# Patient Record
Sex: FEMALE | Race: WHITE | NOT HISPANIC OR LATINO | URBAN - METROPOLITAN AREA
[De-identification: names, ages, dates, MRNs, and addresses within clinical notes are randomized per-mention and may not be internally consistent; named-entity substitution may affect disease eponyms.]

---

## 2019-02-12 ENCOUNTER — OUTPATIENT (OUTPATIENT)
Dept: OUTPATIENT SERVICES | Facility: HOSPITAL | Age: 64
LOS: 1 days | End: 2019-02-12
Payer: MEDICAID

## 2019-02-12 VITALS
RESPIRATION RATE: 18 BRPM | SYSTOLIC BLOOD PRESSURE: 161 MMHG | TEMPERATURE: 98 F | WEIGHT: 175.05 LBS | DIASTOLIC BLOOD PRESSURE: 84 MMHG | HEART RATE: 51 BPM | HEIGHT: 63 IN | OXYGEN SATURATION: 100 %

## 2019-02-12 DIAGNOSIS — F41.9 ANXIETY DISORDER, UNSPECIFIED: ICD-10-CM

## 2019-02-12 DIAGNOSIS — R00.1 BRADYCARDIA, UNSPECIFIED: ICD-10-CM

## 2019-02-12 DIAGNOSIS — N60.01 SOLITARY CYST OF RIGHT BREAST: Chronic | ICD-10-CM

## 2019-02-12 DIAGNOSIS — M17.12 UNILATERAL PRIMARY OSTEOARTHRITIS, LEFT KNEE: ICD-10-CM

## 2019-02-12 DIAGNOSIS — Z98.890 OTHER SPECIFIED POSTPROCEDURAL STATES: Chronic | ICD-10-CM

## 2019-02-12 DIAGNOSIS — Z01.818 ENCOUNTER FOR OTHER PREPROCEDURAL EXAMINATION: ICD-10-CM

## 2019-02-12 DIAGNOSIS — Z90.710 ACQUIRED ABSENCE OF BOTH CERVIX AND UTERUS: Chronic | ICD-10-CM

## 2019-02-12 DIAGNOSIS — Z90.49 ACQUIRED ABSENCE OF OTHER SPECIFIED PARTS OF DIGESTIVE TRACT: Chronic | ICD-10-CM

## 2019-02-12 LAB
ALBUMIN SERPL ELPH-MCNC: 3.7 G/DL — SIGNIFICANT CHANGE UP (ref 3.5–5)
ALP SERPL-CCNC: 48 U/L — SIGNIFICANT CHANGE UP (ref 40–120)
ALT FLD-CCNC: 17 U/L DA — SIGNIFICANT CHANGE UP (ref 10–60)
ANION GAP SERPL CALC-SCNC: 6 MMOL/L — SIGNIFICANT CHANGE UP (ref 5–17)
APTT BLD: 29.8 SEC — SIGNIFICANT CHANGE UP (ref 27.5–36.3)
AST SERPL-CCNC: 14 U/L — SIGNIFICANT CHANGE UP (ref 10–40)
BILIRUB SERPL-MCNC: 0.5 MG/DL — SIGNIFICANT CHANGE UP (ref 0.2–1.2)
BLD GP AB SCN SERPL QL: SIGNIFICANT CHANGE UP
BUN SERPL-MCNC: 13 MG/DL — SIGNIFICANT CHANGE UP (ref 7–18)
CALCIUM SERPL-MCNC: 9 MG/DL — SIGNIFICANT CHANGE UP (ref 8.4–10.5)
CHLORIDE SERPL-SCNC: 105 MMOL/L — SIGNIFICANT CHANGE UP (ref 96–108)
CO2 SERPL-SCNC: 29 MMOL/L — SIGNIFICANT CHANGE UP (ref 22–31)
CREAT SERPL-MCNC: 0.71 MG/DL — SIGNIFICANT CHANGE UP (ref 0.5–1.3)
GLUCOSE SERPL-MCNC: 93 MG/DL — SIGNIFICANT CHANGE UP (ref 70–99)
HBA1C BLD-MCNC: 5.2 % — SIGNIFICANT CHANGE UP (ref 4–5.6)
HCT VFR BLD CALC: 40.3 % — SIGNIFICANT CHANGE UP (ref 34.5–45)
HGB BLD-MCNC: 13.3 G/DL — SIGNIFICANT CHANGE UP (ref 11.5–15.5)
INR BLD: 1.13 RATIO — SIGNIFICANT CHANGE UP (ref 0.88–1.16)
MCHC RBC-ENTMCNC: 32 PG — SIGNIFICANT CHANGE UP (ref 27–34)
MCHC RBC-ENTMCNC: 33 GM/DL — SIGNIFICANT CHANGE UP (ref 32–36)
MCV RBC AUTO: 97.2 FL — SIGNIFICANT CHANGE UP (ref 80–100)
MRSA PCR RESULT.: SIGNIFICANT CHANGE UP
PLATELET # BLD AUTO: 204 K/UL — SIGNIFICANT CHANGE UP (ref 150–400)
POTASSIUM SERPL-MCNC: 4.3 MMOL/L — SIGNIFICANT CHANGE UP (ref 3.5–5.3)
POTASSIUM SERPL-SCNC: 4.3 MMOL/L — SIGNIFICANT CHANGE UP (ref 3.5–5.3)
PROT SERPL-MCNC: 6.9 G/DL — SIGNIFICANT CHANGE UP (ref 6–8.3)
PROTHROM AB SERPL-ACNC: 12.6 SEC — SIGNIFICANT CHANGE UP (ref 10–12.9)
RBC # BLD: 4.14 M/UL — SIGNIFICANT CHANGE UP (ref 3.8–5.2)
RBC # FLD: 12.3 % — SIGNIFICANT CHANGE UP (ref 10.3–14.5)
S AUREUS DNA NOSE QL NAA+PROBE: SIGNIFICANT CHANGE UP
SODIUM SERPL-SCNC: 140 MMOL/L — SIGNIFICANT CHANGE UP (ref 135–145)
T3 SERPL-MCNC: 85 NG/DL — SIGNIFICANT CHANGE UP (ref 80–200)
TSH SERPL-MCNC: 0.7 UU/ML — SIGNIFICANT CHANGE UP (ref 0.34–4.82)
WBC # BLD: 4.04 K/UL — SIGNIFICANT CHANGE UP (ref 3.8–10.5)
WBC # FLD AUTO: 4.04 K/UL — SIGNIFICANT CHANGE UP (ref 3.8–10.5)

## 2019-02-12 PROCEDURE — 93005 ELECTROCARDIOGRAM TRACING: CPT

## 2019-02-12 PROCEDURE — 85730 THROMBOPLASTIN TIME PARTIAL: CPT

## 2019-02-12 PROCEDURE — 80053 COMPREHEN METABOLIC PANEL: CPT

## 2019-02-12 PROCEDURE — 86900 BLOOD TYPING SEROLOGIC ABO: CPT

## 2019-02-12 PROCEDURE — 86850 RBC ANTIBODY SCREEN: CPT

## 2019-02-12 PROCEDURE — 87640 STAPH A DNA AMP PROBE: CPT

## 2019-02-12 PROCEDURE — 36415 COLL VENOUS BLD VENIPUNCTURE: CPT

## 2019-02-12 PROCEDURE — 85027 COMPLETE CBC AUTOMATED: CPT

## 2019-02-12 PROCEDURE — 84480 ASSAY TRIIODOTHYRONINE (T3): CPT

## 2019-02-12 PROCEDURE — 93306 TTE W/DOPPLER COMPLETE: CPT

## 2019-02-12 PROCEDURE — 84443 ASSAY THYROID STIM HORMONE: CPT

## 2019-02-12 PROCEDURE — 84436 ASSAY OF TOTAL THYROXINE: CPT

## 2019-02-12 PROCEDURE — 86901 BLOOD TYPING SEROLOGIC RH(D): CPT

## 2019-02-12 PROCEDURE — 85610 PROTHROMBIN TIME: CPT

## 2019-02-12 PROCEDURE — 83036 HEMOGLOBIN GLYCOSYLATED A1C: CPT

## 2019-02-12 PROCEDURE — G0463: CPT

## 2019-02-12 PROCEDURE — 87641 MR-STAPH DNA AMP PROBE: CPT

## 2019-02-12 RX ORDER — SODIUM CHLORIDE 9 MG/ML
3 INJECTION INTRAMUSCULAR; INTRAVENOUS; SUBCUTANEOUS EVERY 8 HOURS
Qty: 0 | Refills: 0 | Status: DISCONTINUED | OUTPATIENT
Start: 2019-02-25 | End: 2019-02-27

## 2019-02-12 RX ORDER — OXYCODONE HYDROCHLORIDE 5 MG/1
1 TABLET ORAL
Qty: 0 | Refills: 0 | COMMUNITY

## 2019-02-12 RX ORDER — ALPRAZOLAM 0.25 MG
1 TABLET ORAL
Qty: 0 | Refills: 0 | COMMUNITY

## 2019-02-12 RX ORDER — TRANEXAMIC ACID 100 MG/ML
1000 INJECTION, SOLUTION INTRAVENOUS ONCE
Qty: 0 | Refills: 0 | Status: DISCONTINUED | OUTPATIENT
Start: 2019-02-25 | End: 2019-02-27

## 2019-02-12 NOTE — H&P PST ADULT - NEGATIVE GASTROINTESTINAL SYMPTOMS
no diarrhea/no change in bowel habits/no flatulence/no nausea/no constipation/no vomiting/no abdominal pain/no melena

## 2019-02-12 NOTE — H&P PST ADULT - PROBLEM SELECTOR PLAN 3
h/o bradycardia during colonoscopy and during epidural procedures. Cardiac work-up negative as per pt. Case discussed with Anesthesiologist Dr Garcia who is requesting cardiac clearance. Seen by Dr Veras for medical clearance who is also requesting Cardiac clearance. Pt to be seen on 2/19/2019 by Dr Randall for cardiac clearance. Echocardiogram done today-result pending. h/o bradycardia during colonoscopy and during epidural procedures. Cardiac work-up negative as per pt. Case discussed with Anesthesiologist Dr Garcia who is requesting cardiac clearance. Seen by Dr Vreas for medical clearance who is also requesting Cardiac clearance. Pt to be seen on 2/19/2019 by Dr Randall for cardiac clearance. Echocardiogram done today-result pending. As per pt, she underwent cardiac cath at [facility] which was negative. Old cardiac records from [facility] requested.

## 2019-02-12 NOTE — H&P PST ADULT - NSANTHOSAYNRD_GEN_A_CORE
No. SAMEER screening performed.  STOP BANG Legend: 0-2 = LOW Risk; 3-4 = INTERMEDIATE Risk; 5-8 = HIGH Risk

## 2019-02-12 NOTE — H&P PST ADULT - HISTORY OF PRESENT ILLNESS
63 yr old female with PMH of seasonal allergies, presents with c/o left knee pain due to osteoarthritis. Pt reports worsening of pain with prolonged ambulation and standing. Recent X-Ray revealed loss of cartilage. Pt for left total knee replacement on 2/25/2019 63 yr old female with PMH of seasonal allergies, presents with c/o left knee pain due to osteoarthritis sustained after falling down. Pt reports worsening of pain with prolonged ambulation and standing. Recent X-Ray revealed loss of cartilage. Pt for left total knee replacement on 2/25/2019 63 yr old female with PMH of seasonal allergies, depression, anxiety, panic attacks, bradycardia, presents with c/o left knee pain due to osteoarthritis sustained after falling down. Pt reports worsening of pain with prolonged ambulation and standing. Recent X-Ray revealed loss of cartilage. Pt for left total knee replacement on 2/25/2019

## 2019-02-12 NOTE — H&P PST ADULT - RS GEN PE MLT RESP DETAILS PC
no rales/airway patent/respirations non-labored/no chest wall tenderness/no intercostal retractions/normal/breath sounds equal/no wheezes/no subcutaneous emphysema/no rhonchi/good air movement/clear to auscultation bilaterally

## 2019-02-12 NOTE — H&P PST ADULT - PMH
Osteoarthritis of left knee Anxiety    Osteoarthritis of left knee    Panic attack    Seasonal allergies Anxiety    Bradycardia    Depression    Osteoarthritis of left knee    Panic attack    Seasonal allergies

## 2019-02-12 NOTE — H&P PST ADULT - LAST ECHOCARDIOGRAM
2/12/2019 2/12/2019 mild MR, trace TR, normal left and right ventricular systolic function, bradycardic during entire study, EF 55 to 66%.

## 2019-02-12 NOTE — H&P PST ADULT - PSH
Cyst of right breast  h/o aspiration of cyst  H/O abdominal hysterectomy    H/O arthroscopy of right knee    H/O eye surgery  LASIK surgery  H/O varicose vein ligation and stripping  left left  History of arthroscopy of left knee  x 2  History of bunionectomy of right great toe    History of cholecystectomy    History of open reduction and internal fixation (ORIF) procedure  right wrist fractrure

## 2019-02-12 NOTE — H&P PST ADULT - NEGATIVE ALLERGY TYPES
no reactions to medicines/no reactions to food/no reactions to insect bites/no indoor environmental allergies/no reactions to animals

## 2019-02-12 NOTE — H&P PST ADULT - FAMILY HISTORY
Father  Still living? Yes, Estimated age: Age Unknown  Family history of hypertension, Age at diagnosis: Age Unknown  Cerebrovascular accident, Age at diagnosis: Age Unknown     Mother  Still living? Yes, Estimated age: Age Unknown  Family history of hypertension, Age at diagnosis: Age Unknown     Sibling  Still living? Yes, Estimated age: Age Unknown  Asthma, Age at diagnosis: Age Unknown  Family history of coronary artery disease in brother, Age at diagnosis: Age Unknown  Family history of percutaneous transluminal coronary angioplasty, Age at diagnosis: Age Unknown

## 2019-02-12 NOTE — H&P PST ADULT - NEGATIVE CARDIOVASCULAR SYMPTOMS
no orthopnea/no chest pain/no claudication/no peripheral edema/no paroxysmal nocturnal dyspnea/no dyspnea on exertion/no palpitations

## 2019-02-12 NOTE — H&P PST ADULT - PROBLEM SELECTOR PLAN 1
left total knee replacement on 2/25/2019 left total knee replacement on 2/25/2019. No NSAIDs 1 week before surgery.  Advised to take Tylenol as needed for pain.

## 2019-02-12 NOTE — H&P PST ADULT - MUSCULOSKELETAL COMMENTS
c/o left knee pain due to OA, collar bone out c/o left knee pain due to OA, collar bone popping out on right side

## 2019-02-12 NOTE — H&P PST ADULT - PROBLEM SELECTOR PLAN 2
Continue use of meds and take with sips of water on day of surgery. Follow-up with provider postop for management.

## 2019-02-12 NOTE — H&P PST ADULT - ASSESSMENT
63 yr old female with PMH of seasonal allergies, presents with c/o left knee pain due to osteoarthritis sustained after falling down. Pt reports worsening of pain with prolonged ambulation and standing. Recent X-Ray revealed loss of cartilage. Pt for left total knee replacement on 2/25/2019 63 yr old female with PMH of seasonal allergies,  depression, anxiety, panic attacks, bradycardia presents with left knee osteoarthritis. Pt for left total knee replacement on 2/25/2019 63 yr old female with PMH of seasonal allergies,  depression, anxiety, panic attacks, bradycardia presents with left knee osteoarthritis. Pt for left total knee replacement on 2/25/2019.

## 2019-02-25 ENCOUNTER — INPATIENT (INPATIENT)
Facility: HOSPITAL | Age: 64
LOS: 1 days | Discharge: HOME | DRG: 470 | End: 2019-02-27
Attending: ORTHOPAEDIC SURGERY | Admitting: ORTHOPAEDIC SURGERY
Payer: MEDICAID

## 2019-02-25 VITALS
OXYGEN SATURATION: 100 % | DIASTOLIC BLOOD PRESSURE: 84 MMHG | TEMPERATURE: 97 F | HEART RATE: 52 BPM | SYSTOLIC BLOOD PRESSURE: 161 MMHG | WEIGHT: 175.05 LBS | HEIGHT: 63 IN | RESPIRATION RATE: 14 BRPM

## 2019-02-25 DIAGNOSIS — Z98.890 OTHER SPECIFIED POSTPROCEDURAL STATES: Chronic | ICD-10-CM

## 2019-02-25 DIAGNOSIS — N60.01 SOLITARY CYST OF RIGHT BREAST: Chronic | ICD-10-CM

## 2019-02-25 DIAGNOSIS — Z90.49 ACQUIRED ABSENCE OF OTHER SPECIFIED PARTS OF DIGESTIVE TRACT: Chronic | ICD-10-CM

## 2019-02-25 DIAGNOSIS — Z90.710 ACQUIRED ABSENCE OF BOTH CERVIX AND UTERUS: Chronic | ICD-10-CM

## 2019-02-25 DIAGNOSIS — M17.12 UNILATERAL PRIMARY OSTEOARTHRITIS, LEFT KNEE: ICD-10-CM

## 2019-02-25 LAB
ANION GAP SERPL CALC-SCNC: 4 MMOL/L — LOW (ref 5–17)
BLD GP AB SCN SERPL QL: SIGNIFICANT CHANGE UP
BUN SERPL-MCNC: 15 MG/DL — SIGNIFICANT CHANGE UP (ref 7–18)
CALCIUM SERPL-MCNC: 8.6 MG/DL — SIGNIFICANT CHANGE UP (ref 8.4–10.5)
CHLORIDE SERPL-SCNC: 107 MMOL/L — SIGNIFICANT CHANGE UP (ref 96–108)
CO2 SERPL-SCNC: 31 MMOL/L — SIGNIFICANT CHANGE UP (ref 22–31)
CREAT SERPL-MCNC: 0.74 MG/DL — SIGNIFICANT CHANGE UP (ref 0.5–1.3)
GLUCOSE SERPL-MCNC: 91 MG/DL — SIGNIFICANT CHANGE UP (ref 70–99)
HCT VFR BLD CALC: 39.4 % — SIGNIFICANT CHANGE UP (ref 34.5–45)
HGB BLD-MCNC: 12.7 G/DL — SIGNIFICANT CHANGE UP (ref 11.5–15.5)
MCHC RBC-ENTMCNC: 31.7 PG — SIGNIFICANT CHANGE UP (ref 27–34)
MCHC RBC-ENTMCNC: 32.2 GM/DL — SIGNIFICANT CHANGE UP (ref 32–36)
MCV RBC AUTO: 98.3 FL — SIGNIFICANT CHANGE UP (ref 80–100)
NRBC # BLD: 0 /100 WBCS — SIGNIFICANT CHANGE UP (ref 0–0)
PLATELET # BLD AUTO: 191 K/UL — SIGNIFICANT CHANGE UP (ref 150–400)
POTASSIUM SERPL-MCNC: 4.5 MMOL/L — SIGNIFICANT CHANGE UP (ref 3.5–5.3)
POTASSIUM SERPL-SCNC: 4.5 MMOL/L — SIGNIFICANT CHANGE UP (ref 3.5–5.3)
RBC # BLD: 4.01 M/UL — SIGNIFICANT CHANGE UP (ref 3.8–5.2)
RBC # FLD: 12.4 % — SIGNIFICANT CHANGE UP (ref 10.3–14.5)
SODIUM SERPL-SCNC: 142 MMOL/L — SIGNIFICANT CHANGE UP (ref 135–145)
WBC # BLD: 3.87 K/UL — SIGNIFICANT CHANGE UP (ref 3.8–10.5)
WBC # FLD AUTO: 3.87 K/UL — SIGNIFICANT CHANGE UP (ref 3.8–10.5)

## 2019-02-25 PROCEDURE — 73562 X-RAY EXAM OF KNEE 3: CPT | Mod: 26,LT,76

## 2019-02-25 PROCEDURE — 27465 SHORTENING OF THIGH BONE: CPT | Mod: AS,LT

## 2019-02-25 PROCEDURE — 27447 TOTAL KNEE ARTHROPLASTY: CPT | Mod: AS,LT

## 2019-02-25 RX ORDER — ACETAMINOPHEN 500 MG
1000 TABLET ORAL EVERY 8 HOURS
Qty: 0 | Refills: 0 | Status: DISCONTINUED | OUTPATIENT
Start: 2019-02-25 | End: 2019-02-25

## 2019-02-25 RX ORDER — OXYCODONE HYDROCHLORIDE 5 MG/1
5 TABLET ORAL EVERY 4 HOURS
Qty: 0 | Refills: 0 | Status: DISCONTINUED | OUTPATIENT
Start: 2019-02-25 | End: 2019-02-27

## 2019-02-25 RX ORDER — CELECOXIB 200 MG/1
200 CAPSULE ORAL ONCE
Qty: 0 | Refills: 0 | Status: COMPLETED | OUTPATIENT
Start: 2019-02-25 | End: 2019-02-25

## 2019-02-25 RX ORDER — POLYETHYLENE GLYCOL 3350 17 G/17G
17 POWDER, FOR SOLUTION ORAL DAILY
Qty: 0 | Refills: 0 | Status: DISCONTINUED | OUTPATIENT
Start: 2019-02-25 | End: 2019-02-27

## 2019-02-25 RX ORDER — CEFAZOLIN SODIUM 1 G
1000 VIAL (EA) INJECTION EVERY 8 HOURS
Qty: 0 | Refills: 0 | Status: COMPLETED | OUTPATIENT
Start: 2019-02-25 | End: 2019-02-26

## 2019-02-25 RX ORDER — HYDROMORPHONE HYDROCHLORIDE 2 MG/ML
0.5 INJECTION INTRAMUSCULAR; INTRAVENOUS; SUBCUTANEOUS EVERY 4 HOURS
Qty: 0 | Refills: 0 | Status: DISCONTINUED | OUTPATIENT
Start: 2019-02-25 | End: 2019-02-27

## 2019-02-25 RX ORDER — GABAPENTIN 400 MG/1
100 CAPSULE ORAL ONCE
Qty: 0 | Refills: 0 | Status: COMPLETED | OUTPATIENT
Start: 2019-02-25 | End: 2019-02-25

## 2019-02-25 RX ORDER — MAGNESIUM HYDROXIDE 400 MG/1
30 TABLET, CHEWABLE ORAL DAILY
Qty: 0 | Refills: 0 | Status: DISCONTINUED | OUTPATIENT
Start: 2019-02-25 | End: 2019-02-27

## 2019-02-25 RX ORDER — DOCUSATE SODIUM 100 MG
100 CAPSULE ORAL THREE TIMES A DAY
Qty: 0 | Refills: 0 | Status: DISCONTINUED | OUTPATIENT
Start: 2019-02-25 | End: 2019-02-27

## 2019-02-25 RX ORDER — ALPRAZOLAM 0.25 MG
1 TABLET ORAL
Qty: 0 | Refills: 0 | Status: DISCONTINUED | OUTPATIENT
Start: 2019-02-25 | End: 2019-02-27

## 2019-02-25 RX ORDER — ASCORBIC ACID 60 MG
500 TABLET,CHEWABLE ORAL
Qty: 0 | Refills: 0 | Status: DISCONTINUED | OUTPATIENT
Start: 2019-02-25 | End: 2019-02-27

## 2019-02-25 RX ORDER — TRAMADOL HYDROCHLORIDE 50 MG/1
50 TABLET ORAL ONCE
Qty: 0 | Refills: 0 | Status: DISCONTINUED | OUTPATIENT
Start: 2019-02-25 | End: 2019-02-25

## 2019-02-25 RX ORDER — FOLIC ACID 0.8 MG
1 TABLET ORAL DAILY
Qty: 0 | Refills: 0 | Status: DISCONTINUED | OUTPATIENT
Start: 2019-02-25 | End: 2019-02-27

## 2019-02-25 RX ORDER — FERROUS SULFATE 325(65) MG
325 TABLET ORAL
Qty: 0 | Refills: 0 | Status: DISCONTINUED | OUTPATIENT
Start: 2019-02-25 | End: 2019-02-27

## 2019-02-25 RX ORDER — SENNA PLUS 8.6 MG/1
2 TABLET ORAL AT BEDTIME
Qty: 0 | Refills: 0 | Status: DISCONTINUED | OUTPATIENT
Start: 2019-02-25 | End: 2019-02-27

## 2019-02-25 RX ORDER — ACETAMINOPHEN 500 MG
650 TABLET ORAL EVERY 6 HOURS
Qty: 0 | Refills: 0 | Status: DISCONTINUED | OUTPATIENT
Start: 2019-02-25 | End: 2019-02-27

## 2019-02-25 RX ORDER — ENOXAPARIN SODIUM 100 MG/ML
30 INJECTION SUBCUTANEOUS EVERY 12 HOURS
Qty: 0 | Refills: 0 | Status: DISCONTINUED | OUTPATIENT
Start: 2019-02-26 | End: 2019-02-27

## 2019-02-25 RX ORDER — TRAMADOL HYDROCHLORIDE 50 MG/1
50 TABLET ORAL EVERY 8 HOURS
Qty: 0 | Refills: 0 | Status: DISCONTINUED | OUTPATIENT
Start: 2019-02-25 | End: 2019-02-27

## 2019-02-25 RX ORDER — ACETAMINOPHEN 500 MG
1000 TABLET ORAL EVERY 8 HOURS
Qty: 0 | Refills: 0 | Status: COMPLETED | OUTPATIENT
Start: 2019-02-25 | End: 2019-02-26

## 2019-02-25 RX ORDER — SODIUM CHLORIDE 9 MG/ML
1000 INJECTION INTRAMUSCULAR; INTRAVENOUS; SUBCUTANEOUS
Qty: 0 | Refills: 0 | Status: DISCONTINUED | OUTPATIENT
Start: 2019-02-25 | End: 2019-02-27

## 2019-02-25 RX ORDER — ONDANSETRON 8 MG/1
4 TABLET, FILM COATED ORAL EVERY 6 HOURS
Qty: 0 | Refills: 0 | Status: DISCONTINUED | OUTPATIENT
Start: 2019-02-25 | End: 2019-02-27

## 2019-02-25 RX ORDER — KETOROLAC TROMETHAMINE 30 MG/ML
30 SYRINGE (ML) INJECTION EVERY 6 HOURS
Qty: 0 | Refills: 0 | Status: DISCONTINUED | OUTPATIENT
Start: 2019-02-25 | End: 2019-02-27

## 2019-02-25 RX ADMIN — Medication 100 MILLIGRAM(S): at 17:45

## 2019-02-25 RX ADMIN — TRAMADOL HYDROCHLORIDE 50 MILLIGRAM(S): 50 TABLET ORAL at 08:33

## 2019-02-25 RX ADMIN — GABAPENTIN 100 MILLIGRAM(S): 400 CAPSULE ORAL at 08:33

## 2019-02-25 RX ADMIN — Medication 100 MILLIGRAM(S): at 23:11

## 2019-02-25 RX ADMIN — SODIUM CHLORIDE 3 MILLILITER(S): 9 INJECTION INTRAMUSCULAR; INTRAVENOUS; SUBCUTANEOUS at 23:08

## 2019-02-25 RX ADMIN — Medication 400 MILLIGRAM(S): at 20:40

## 2019-02-25 RX ADMIN — Medication 1 TABLET(S): at 23:10

## 2019-02-25 RX ADMIN — Medication 1000 MILLIGRAM(S): at 20:51

## 2019-02-25 RX ADMIN — Medication 1 MILLIGRAM(S): at 23:08

## 2019-02-25 RX ADMIN — Medication 1 MILLIGRAM(S): at 23:10

## 2019-02-25 RX ADMIN — SODIUM CHLORIDE 3 MILLILITER(S): 9 INJECTION INTRAMUSCULAR; INTRAVENOUS; SUBCUTANEOUS at 08:35

## 2019-02-25 RX ADMIN — CELECOXIB 200 MILLIGRAM(S): 200 CAPSULE ORAL at 08:33

## 2019-02-25 RX ADMIN — TRAMADOL HYDROCHLORIDE 50 MILLIGRAM(S): 50 TABLET ORAL at 23:08

## 2019-02-25 NOTE — PHYSICAL THERAPY INITIAL EVALUATION ADULT - PERTINENT HX OF CURRENT PROBLEM, REHAB EVAL
Pt. c/o left knee pain due to osteoarthritis sustained after falling down. Pt reports worsening of pain with prolonged ambulation and standing.. Underwent left TKR today.

## 2019-02-25 NOTE — PHYSICAL THERAPY INITIAL EVALUATION ADULT - CRITERIA FOR SKILLED THERAPEUTIC INTERVENTIONS
rehab potential/functional limitations in following categories/risk reduction/prevention/anticipated discharge recommendation/impairments found/therapy frequency/predicted duration of therapy intervention/anticipated equipment needs at discharge

## 2019-02-25 NOTE — PHYSICAL THERAPY INITIAL EVALUATION ADULT - GENERAL OBSERVATIONS, REHAB EVAL
Pt. found lying supine in NAD; on SCD's and IV's. Pt. cooperative and motivated during eval. Pt's spouse was present at bedside. Left knee dressing/ace wrap c/d/i.

## 2019-02-26 DIAGNOSIS — Z96.652 PRESENCE OF LEFT ARTIFICIAL KNEE JOINT: ICD-10-CM

## 2019-02-26 DIAGNOSIS — G89.18 OTHER ACUTE POSTPROCEDURAL PAIN: ICD-10-CM

## 2019-02-26 LAB
ANION GAP SERPL CALC-SCNC: 4 MMOL/L — LOW (ref 5–17)
BUN SERPL-MCNC: 13 MG/DL — SIGNIFICANT CHANGE UP (ref 7–18)
CALCIUM SERPL-MCNC: 8 MG/DL — LOW (ref 8.4–10.5)
CHLORIDE SERPL-SCNC: 106 MMOL/L — SIGNIFICANT CHANGE UP (ref 96–108)
CO2 SERPL-SCNC: 29 MMOL/L — SIGNIFICANT CHANGE UP (ref 22–31)
CREAT SERPL-MCNC: 0.69 MG/DL — SIGNIFICANT CHANGE UP (ref 0.5–1.3)
GLUCOSE SERPL-MCNC: 112 MG/DL — HIGH (ref 70–99)
HCT VFR BLD CALC: 31.8 % — LOW (ref 34.5–45)
HGB BLD-MCNC: 10.2 G/DL — LOW (ref 11.5–15.5)
MCHC RBC-ENTMCNC: 31.4 PG — SIGNIFICANT CHANGE UP (ref 27–34)
MCHC RBC-ENTMCNC: 32.1 GM/DL — SIGNIFICANT CHANGE UP (ref 32–36)
MCV RBC AUTO: 97.8 FL — SIGNIFICANT CHANGE UP (ref 80–100)
NRBC # BLD: 0 /100 WBCS — SIGNIFICANT CHANGE UP (ref 0–0)
PLATELET # BLD AUTO: 158 K/UL — SIGNIFICANT CHANGE UP (ref 150–400)
POTASSIUM SERPL-MCNC: 4 MMOL/L — SIGNIFICANT CHANGE UP (ref 3.5–5.3)
POTASSIUM SERPL-SCNC: 4 MMOL/L — SIGNIFICANT CHANGE UP (ref 3.5–5.3)
RBC # BLD: 3.25 M/UL — LOW (ref 3.8–5.2)
RBC # FLD: 12.6 % — SIGNIFICANT CHANGE UP (ref 10.3–14.5)
SODIUM SERPL-SCNC: 139 MMOL/L — SIGNIFICANT CHANGE UP (ref 135–145)
WBC # BLD: 4.76 K/UL — SIGNIFICANT CHANGE UP (ref 3.8–10.5)
WBC # FLD AUTO: 4.76 K/UL — SIGNIFICANT CHANGE UP (ref 3.8–10.5)

## 2019-02-26 RX ADMIN — Medication 100 MILLIGRAM(S): at 06:05

## 2019-02-26 RX ADMIN — Medication 400 MILLIGRAM(S): at 11:58

## 2019-02-26 RX ADMIN — Medication 100 MILLIGRAM(S): at 02:27

## 2019-02-26 RX ADMIN — TRAMADOL HYDROCHLORIDE 50 MILLIGRAM(S): 50 TABLET ORAL at 07:06

## 2019-02-26 RX ADMIN — Medication 1 MILLIGRAM(S): at 21:28

## 2019-02-26 RX ADMIN — SODIUM CHLORIDE 3 MILLILITER(S): 9 INJECTION INTRAMUSCULAR; INTRAVENOUS; SUBCUTANEOUS at 06:03

## 2019-02-26 RX ADMIN — OXYCODONE HYDROCHLORIDE 5 MILLIGRAM(S): 5 TABLET ORAL at 23:37

## 2019-02-26 RX ADMIN — Medication 1 TABLET(S): at 13:59

## 2019-02-26 RX ADMIN — Medication 100 MILLIGRAM(S): at 13:59

## 2019-02-26 RX ADMIN — Medication 325 MILLIGRAM(S): at 17:52

## 2019-02-26 RX ADMIN — Medication 1 MILLIGRAM(S): at 13:55

## 2019-02-26 RX ADMIN — Medication 1000 MILLIGRAM(S): at 03:45

## 2019-02-26 RX ADMIN — SODIUM CHLORIDE 3 MILLILITER(S): 9 INJECTION INTRAMUSCULAR; INTRAVENOUS; SUBCUTANEOUS at 21:29

## 2019-02-26 RX ADMIN — TRAMADOL HYDROCHLORIDE 50 MILLIGRAM(S): 50 TABLET ORAL at 00:10

## 2019-02-26 RX ADMIN — Medication 1 MILLIGRAM(S): at 06:05

## 2019-02-26 RX ADMIN — Medication 20 MILLIGRAM(S): at 11:26

## 2019-02-26 RX ADMIN — Medication 400 MILLIGRAM(S): at 03:29

## 2019-02-26 RX ADMIN — OXYCODONE HYDROCHLORIDE 5 MILLIGRAM(S): 5 TABLET ORAL at 22:35

## 2019-02-26 RX ADMIN — Medication 1000 MILLIGRAM(S): at 12:13

## 2019-02-26 RX ADMIN — Medication 30 MILLIGRAM(S): at 11:39

## 2019-02-26 RX ADMIN — Medication 30 MILLIGRAM(S): at 11:24

## 2019-02-26 RX ADMIN — ENOXAPARIN SODIUM 30 MILLIGRAM(S): 100 INJECTION SUBCUTANEOUS at 13:53

## 2019-02-26 RX ADMIN — POLYETHYLENE GLYCOL 3350 17 GRAM(S): 17 POWDER, FOR SOLUTION ORAL at 11:26

## 2019-02-26 RX ADMIN — OXYCODONE HYDROCHLORIDE 5 MILLIGRAM(S): 5 TABLET ORAL at 17:51

## 2019-02-26 RX ADMIN — Medication 500 MILLIGRAM(S): at 17:52

## 2019-02-26 RX ADMIN — OXYCODONE HYDROCHLORIDE 5 MILLIGRAM(S): 5 TABLET ORAL at 08:33

## 2019-02-26 RX ADMIN — Medication 500 MILLIGRAM(S): at 06:05

## 2019-02-26 RX ADMIN — ENOXAPARIN SODIUM 30 MILLIGRAM(S): 100 INJECTION SUBCUTANEOUS at 01:30

## 2019-02-26 RX ADMIN — Medication 325 MILLIGRAM(S): at 08:33

## 2019-02-26 RX ADMIN — TRAMADOL HYDROCHLORIDE 50 MILLIGRAM(S): 50 TABLET ORAL at 13:54

## 2019-02-26 RX ADMIN — Medication 325 MILLIGRAM(S): at 11:58

## 2019-02-26 RX ADMIN — TRAMADOL HYDROCHLORIDE 50 MILLIGRAM(S): 50 TABLET ORAL at 06:06

## 2019-02-26 RX ADMIN — Medication 100 MILLIGRAM(S): at 21:29

## 2019-02-26 RX ADMIN — OXYCODONE HYDROCHLORIDE 5 MILLIGRAM(S): 5 TABLET ORAL at 18:45

## 2019-02-26 RX ADMIN — OXYCODONE HYDROCHLORIDE 5 MILLIGRAM(S): 5 TABLET ORAL at 09:30

## 2019-02-26 RX ADMIN — SODIUM CHLORIDE 3 MILLILITER(S): 9 INJECTION INTRAMUSCULAR; INTRAVENOUS; SUBCUTANEOUS at 14:59

## 2019-02-26 RX ADMIN — TRAMADOL HYDROCHLORIDE 50 MILLIGRAM(S): 50 TABLET ORAL at 14:45

## 2019-02-26 NOTE — CONSULT NOTE ADULT - SUBJECTIVE AND OBJECTIVE BOX
Chief Complaint:    HPI:  63 yr old female with PMH of seasonal allergies, depression, anxiety, panic attacks, bradycardia, presents with c/o left knee pain due to osteoarthritis sustained after falling down. Pt reports worsening of pain with prolonged ambulation and standing. Recent X-Ray revealed loss of cartilage. Pt for left total knee replacement on 2/25/2019 (12 Feb 2019 10:35).    63 year old female, s/p left knee replacement, pod#1. Pt oob and ambulating with PT around unit.  No nausea or vomiting.  No chest pain or sob. + left knee pain which occurs on movement.  Pt is on oxy at home.        PAST MEDICAL & SURGICAL HISTORY:  Depression  Bradycardia  Seasonal allergies  Panic attack  Anxiety  Osteoarthritis of left knee  H/O varicose vein ligation and stripping: left left  Cyst of right breast: h/o aspiration of cyst  History of bunionectomy of right great toe  H/O eye surgery: LASIK surgery  H/O abdominal hysterectomy  H/O arthroscopy of right knee  History of arthroscopy of left knee: x 2  History of open reduction and internal fixation (ORIF) procedure: right wrist fractrure  History of cholecystectomy      FAMILY HISTORY:  Family history of percutaneous transluminal coronary angioplasty (Sibling)  Family history of coronary artery disease in brother (Sibling)  Asthma (Sibling)  Cerebrovascular accident (Father)  Family history of hypertension (Father, Mother)      SOCIAL HISTORY:  [ ] Denies Smoking, Alcohol, or Drug Use    Allergies    No Known Allergies    Intolerances        PAIN MEDICATIONS:  acetaminophen   Tablet .. 650 milliGRAM(s) Oral every 6 hours PRN  ALPRAZolam 1 milliGRAM(s) Oral two times a day  HYDROmorphone  Injectable 0.5 milliGRAM(s) IV Push every 4 hours PRN  ketorolac   Injectable 30 milliGRAM(s) IV Push every 6 hours PRN  ondansetron Injectable 4 milliGRAM(s) IV Push every 6 hours PRN  oxyCODONE    IR 5 milliGRAM(s) Oral every 4 hours PRN  PARoxetine 20 milliGRAM(s) Oral daily  traMADol 50 milliGRAM(s) Oral every 8 hours      Heme:  enoxaparin Injectable 30 milliGRAM(s) SubCutaneous every 12 hours  tranexamic acid IVPB 1000 milliGRAM(s) IV Intermittent once    Antibiotics:    Cardiovascular:    GI:  aluminum hydroxide/magnesium hydroxide/simethicone Suspension 30 milliLiter(s) Oral four times a day PRN  docusate sodium 100 milliGRAM(s) Oral three times a day  magnesium hydroxide Suspension 30 milliLiter(s) Oral daily PRN  polyethylene glycol 3350 17 Gram(s) Oral daily  senna 2 Tablet(s) Oral at bedtime PRN    Endocrine:    All Other Medications:  artificial  tears Solution 1 Drop(s) Both EYES every 4 hours PRN  ascorbic acid 500 milliGRAM(s) Oral two times a day  ferrous    sulfate 325 milliGRAM(s) Oral three times a day with meals  folic acid 1 milliGRAM(s) Oral daily  multivitamin 1 Tablet(s) Oral daily  sodium chloride 0.9% lock flush 3 milliLiter(s) IV Push every 8 hours  sodium chloride 0.9%. 1000 milliLiter(s) IV Continuous <Continuous>      REVIEW OF SYSTEMS:    CONSTITUTIONAL: No fever, weight loss, or fatigue  RESPIRATORY: No cough, wheezing, chills or hemoptysis; No shortness of breath  CARDIOVASCULAR: No chest pain, palpitations, dizziness, or leg swelling  GASTROINTESTINAL: No abdominal or epigastric pain. No nausea, vomiting, or hematemesis; No diarrhea or constipation. No melena or hematochezia.  GENITOURINARY: No dysuria, frequency, hematuria, or incontinence  NEUROLOGICAL: No headaches, memory loss, loss of strength, numbness, or tremors  MUSCULOSKELETAL: + left knee pain        Vital Signs Last 24 Hrs  T(C): 37.2 (26 Feb 2019 06:28), Max: 37.2 (26 Feb 2019 06:28)  T(F): 98.9 (26 Feb 2019 06:28), Max: 98.9 (26 Feb 2019 06:28)  HR: 59 (26 Feb 2019 06:28) (53 - 66)  BP: 114/49 (26 Feb 2019 06:28) (111/48 - 137/54)  BP(mean): 85 (25 Feb 2019 14:11) (85 - 88)  RR: 16 (26 Feb 2019 06:28) (12 - 20)  SpO2: 98% (26 Feb 2019 06:28) (94% - 99%)    PAIN SCORE:      6/10   SCALE USED: (1-10 VNRS)             PHYSICAL EXAM:    GENERAL: NAD, well-groomed, well-developed  NERVOUS SYSTEM:  Alert & Oriented X3, Good concentration; Motor Strength 5/5 B/L upper and lower extremities; DTRs 2+ intact and symmetric  CHEST/LUNG: Clear to percussion bilaterally; No rales, rhonchi, wheezing, or rubs  HEART: Regular rate and rhythm; No murmurs, rubs, or gallops  ABDOMEN: Soft, Nontender, Nondistended; Bowel sounds present  EXTREMITIES:  2+ Peripheral Pulses, No clubbing, cyanosis, or edema  MUSCULOSKELETAL: + left knee tenderness + decreased rom     LABS:                          10.2   4.76  )-----------( 158      ( 26 Feb 2019 08:20 )             31.8     02-26    139  |  106  |  13  ----------------------------<  112<H>  4.0   |  29  |  0.69    Ca    8.0<L>      26 Feb 2019 08:20            RADIOLOGY:    Drug Screen:        RECOMMENDATIONS    [ ]  [place]  Reviewed and Copied to Chart

## 2019-02-26 NOTE — PROGRESS NOTE ADULT - SUBJECTIVE AND OBJECTIVE BOX
63yFemale    Diagnosis:  S/p L Total Knee Replacement POD# 1    Patient was seen and evaluated at bedside. Patient with no acute complaints.   Pain is well controlled. Pt states she ambulated well with PT yesterday.   Denies CP/SOB, dyspnea, paresthesias, N/V/D, palpitations.     Vital Signs Last 24 Hrs  T(C): 37.2 (26 Feb 2019 06:28), Max: 37.2 (26 Feb 2019 06:28)  T(F): 98.9 (26 Feb 2019 06:28), Max: 98.9 (26 Feb 2019 06:28)  HR: 59 (26 Feb 2019 06:28) (53 - 66)  BP: 114/49 (26 Feb 2019 06:28) (111/48 - 137/54)  BP(mean): 85 (25 Feb 2019 14:11) (85 - 88)  RR: 16 (26 Feb 2019 06:28) (12 - 20)  SpO2: 98% (26 Feb 2019 06:28) (94% - 99%)  I&O's Detail    25 Feb 2019 07:01  -  26 Feb 2019 07:00  --------------------------------------------------------  IN:    Lactated Ringers IV Bolus: 750 mL    sodium chloride 0.9%.: 1440 mL  Total IN: 2190 mL    OUT:  Total OUT: 0 mL    Total NET: 2190 mL      Physical Exam:    General: AAOx3, NAD, resting comfortably in bed.    L KNEE:  Dressing is C/D/I. Skin is pink and warm. No erythema. SILT. Able to straight leg raise.   Lower extremity:  No calf tenderness, calves are soft. 2+pulses. NVI. (+) EHL/FHL/ADF/APF.  Good capillary refill.                           10.2   4.76  )-----------( 158      ( 26 Feb 2019 08:20 )             31.8     02-26    139  |  106  |  13  ----------------------------<  112<H>  4.0   |  29  |  0.69    Ca    8.0<L>      26 Feb 2019 08:20        Impression:  63yFemale S/p L Total Knee Replacement POD# 1  Plan:  -  Pain management  -  Dvt prophylaxis  -  Daily Physical Therapy:  WBAT  to LLE  -  Discharge planning: Home tomorrow  -  Heel bump to LLE  -  Incentive Spirometer  -  Continue with Post-op Antibiotics x 24hrs  -  S/w DR. Luna

## 2019-02-26 NOTE — CONSULT NOTE ADULT - PROBLEM SELECTOR RECOMMENDATION 9
- tramadol 50mg po 8 hours  - toradol iv prn  - dc hydromorphone  - add oxy 10mg po q 6 hours prn  - stool softeners

## 2019-02-26 NOTE — PROGRESS NOTE ADULT - PROBLEM SELECTOR PLAN 1
63yFemale S/p L Total Knee Replacement POD# 1  - Pain control  - DVT ppx  - Daily PT-WBAT to LLE  - Incentive spirometer

## 2019-02-27 VITALS
DIASTOLIC BLOOD PRESSURE: 65 MMHG | RESPIRATION RATE: 17 BRPM | SYSTOLIC BLOOD PRESSURE: 125 MMHG | HEART RATE: 82 BPM | OXYGEN SATURATION: 98 %

## 2019-02-27 LAB
ANION GAP SERPL CALC-SCNC: 5 MMOL/L — SIGNIFICANT CHANGE UP (ref 5–17)
BUN SERPL-MCNC: 10 MG/DL — SIGNIFICANT CHANGE UP (ref 7–18)
CALCIUM SERPL-MCNC: 7.9 MG/DL — LOW (ref 8.4–10.5)
CHLORIDE SERPL-SCNC: 105 MMOL/L — SIGNIFICANT CHANGE UP (ref 96–108)
CO2 SERPL-SCNC: 28 MMOL/L — SIGNIFICANT CHANGE UP (ref 22–31)
CREAT SERPL-MCNC: 0.58 MG/DL — SIGNIFICANT CHANGE UP (ref 0.5–1.3)
GLUCOSE SERPL-MCNC: 108 MG/DL — HIGH (ref 70–99)
HCT VFR BLD CALC: 28.1 % — LOW (ref 34.5–45)
HGB BLD-MCNC: 9 G/DL — LOW (ref 11.5–15.5)
MCHC RBC-ENTMCNC: 31.1 PG — SIGNIFICANT CHANGE UP (ref 27–34)
MCHC RBC-ENTMCNC: 32 GM/DL — SIGNIFICANT CHANGE UP (ref 32–36)
MCV RBC AUTO: 97.2 FL — SIGNIFICANT CHANGE UP (ref 80–100)
NRBC # BLD: 0 /100 WBCS — SIGNIFICANT CHANGE UP (ref 0–0)
PLATELET # BLD AUTO: 143 K/UL — LOW (ref 150–400)
POTASSIUM SERPL-MCNC: 3.9 MMOL/L — SIGNIFICANT CHANGE UP (ref 3.5–5.3)
POTASSIUM SERPL-SCNC: 3.9 MMOL/L — SIGNIFICANT CHANGE UP (ref 3.5–5.3)
RBC # BLD: 2.89 M/UL — LOW (ref 3.8–5.2)
RBC # FLD: 12.5 % — SIGNIFICANT CHANGE UP (ref 10.3–14.5)
SODIUM SERPL-SCNC: 138 MMOL/L — SIGNIFICANT CHANGE UP (ref 135–145)
WBC # BLD: 5.72 K/UL — SIGNIFICANT CHANGE UP (ref 3.8–10.5)
WBC # FLD AUTO: 5.72 K/UL — SIGNIFICANT CHANGE UP (ref 3.8–10.5)

## 2019-02-27 PROCEDURE — 99223 1ST HOSP IP/OBS HIGH 75: CPT

## 2019-02-27 RX ORDER — ENOXAPARIN SODIUM 100 MG/ML
40 INJECTION SUBCUTANEOUS
Qty: 13 | Refills: 0 | OUTPATIENT
Start: 2019-02-27 | End: 2019-03-11

## 2019-02-27 RX ORDER — FOLIC ACID 0.8 MG
1 TABLET ORAL
Qty: 30 | Refills: 0 | OUTPATIENT
Start: 2019-02-27 | End: 2019-03-28

## 2019-02-27 RX ORDER — DOCUSATE SODIUM 100 MG
1 CAPSULE ORAL
Qty: 30 | Refills: 0 | OUTPATIENT
Start: 2019-02-27 | End: 2019-03-13

## 2019-02-27 RX ORDER — FERROUS SULFATE 325(65) MG
1 TABLET ORAL
Qty: 45 | Refills: 0 | OUTPATIENT
Start: 2019-02-27 | End: 2019-03-13

## 2019-02-27 RX ORDER — CELECOXIB 200 MG/1
1 CAPSULE ORAL
Qty: 7 | Refills: 0 | OUTPATIENT
Start: 2019-02-27 | End: 2019-03-05

## 2019-02-27 RX ORDER — IBUPROFEN 200 MG
1 TABLET ORAL
Qty: 0 | Refills: 0 | COMMUNITY

## 2019-02-27 RX ORDER — ASCORBIC ACID 60 MG
1 TABLET,CHEWABLE ORAL
Qty: 30 | Refills: 0 | OUTPATIENT
Start: 2019-02-27 | End: 2019-03-28

## 2019-02-27 RX ADMIN — OXYCODONE HYDROCHLORIDE 5 MILLIGRAM(S): 5 TABLET ORAL at 06:15

## 2019-02-27 RX ADMIN — SODIUM CHLORIDE 3 MILLILITER(S): 9 INJECTION INTRAMUSCULAR; INTRAVENOUS; SUBCUTANEOUS at 05:31

## 2019-02-27 RX ADMIN — SODIUM CHLORIDE 3 MILLILITER(S): 9 INJECTION INTRAMUSCULAR; INTRAVENOUS; SUBCUTANEOUS at 12:49

## 2019-02-27 RX ADMIN — Medication 500 MILLIGRAM(S): at 05:30

## 2019-02-27 RX ADMIN — TRAMADOL HYDROCHLORIDE 50 MILLIGRAM(S): 50 TABLET ORAL at 13:25

## 2019-02-27 RX ADMIN — Medication 325 MILLIGRAM(S): at 13:24

## 2019-02-27 RX ADMIN — Medication 100 MILLIGRAM(S): at 05:31

## 2019-02-27 RX ADMIN — ENOXAPARIN SODIUM 30 MILLIGRAM(S): 100 INJECTION SUBCUTANEOUS at 13:24

## 2019-02-27 RX ADMIN — OXYCODONE HYDROCHLORIDE 5 MILLIGRAM(S): 5 TABLET ORAL at 05:29

## 2019-02-27 RX ADMIN — POLYETHYLENE GLYCOL 3350 17 GRAM(S): 17 POWDER, FOR SOLUTION ORAL at 13:25

## 2019-02-27 RX ADMIN — ENOXAPARIN SODIUM 30 MILLIGRAM(S): 100 INJECTION SUBCUTANEOUS at 01:39

## 2019-02-27 RX ADMIN — TRAMADOL HYDROCHLORIDE 50 MILLIGRAM(S): 50 TABLET ORAL at 14:30

## 2019-02-27 RX ADMIN — Medication 100 MILLIGRAM(S): at 13:24

## 2019-02-27 RX ADMIN — Medication 325 MILLIGRAM(S): at 08:57

## 2019-02-27 RX ADMIN — Medication 1 TABLET(S): at 13:24

## 2019-02-27 RX ADMIN — Medication 1 MILLIGRAM(S): at 13:24

## 2019-02-27 RX ADMIN — Medication 30 MILLIGRAM(S): at 09:33

## 2019-02-27 NOTE — CONSULT NOTE ADULT - SUBJECTIVE AND OBJECTIVE BOX
NP Note discussed with  Primary Attending    Patient is a 63y old  Female who presents with a chief complaint of S/p L TKA (27 Feb 2019 08:05).  Pt is s/p left knee replacement, pod#2.  Pt complaining of left knee pain which worsens with movement.  + left knee pain radiates down back of knee.  No nausea or vomiting.  No chest pain or sob.        INTERVAL HPI/OVERNIGHT EVENTS: no new complaints    MEDICATIONS  (STANDING):  ALPRAZolam 1 milliGRAM(s) Oral two times a day  ascorbic acid 500 milliGRAM(s) Oral two times a day  docusate sodium 100 milliGRAM(s) Oral three times a day  enoxaparin Injectable 30 milliGRAM(s) SubCutaneous every 12 hours  ferrous    sulfate 325 milliGRAM(s) Oral three times a day with meals  folic acid 1 milliGRAM(s) Oral daily  multivitamin 1 Tablet(s) Oral daily  PARoxetine 20 milliGRAM(s) Oral daily  polyethylene glycol 3350 17 Gram(s) Oral daily  sodium chloride 0.9% lock flush 3 milliLiter(s) IV Push every 8 hours  sodium chloride 0.9%. 1000 milliLiter(s) (120 mL/Hr) IV Continuous <Continuous>  traMADol 50 milliGRAM(s) Oral every 8 hours  tranexamic acid IVPB 1000 milliGRAM(s) IV Intermittent once    MEDICATIONS  (PRN):  acetaminophen   Tablet .. 650 milliGRAM(s) Oral every 6 hours PRN Temp greater or equal to 38C (100.4F)  aluminum hydroxide/magnesium hydroxide/simethicone Suspension 30 milliLiter(s) Oral four times a day PRN Indigestion  artificial  tears Solution 1 Drop(s) Both EYES every 4 hours PRN Dry Eyes  bisacodyl Suppository 10 milliGRAM(s) Rectal daily PRN If no bowel movement by postoperative day #2  HYDROmorphone  Injectable 0.5 milliGRAM(s) IV Push every 4 hours PRN Severe Pain (7 - 10)  ketorolac   Injectable 30 milliGRAM(s) IV Push every 6 hours PRN Moderate Pain (4 - 6)  magnesium hydroxide Suspension 30 milliLiter(s) Oral daily PRN Constipation  ondansetron Injectable 4 milliGRAM(s) IV Push every 6 hours PRN Nausea and/or Vomiting  oxyCODONE    IR 5 milliGRAM(s) Oral every 4 hours PRN Mild Pain (1 - 3)  senna 2 Tablet(s) Oral at bedtime PRN Constipation      __________________________________________________  REVIEW OF SYSTEMS:    CONSTITUTIONAL: No fever,   EYES: no acute visual disturbances  NECK: No pain or stiffness  RESPIRATORY: No cough; No shortness of breath  CARDIOVASCULAR: No chest pain, no palpitations  GASTROINTESTINAL: + abdominal pain, - nausea - vomiting  NEUROLOGICAL: No headache or numbness, no tremors  MUSCULOSKELETAL: No joint pain, no muscle pain  GENITOURINARY: no dysuria, no frequency, no hesitancy        Vital Signs Last 24 Hrs  T(C): 37.1 (27 Feb 2019 05:46), Max: 37.8 (26 Feb 2019 21:08)  T(F): 98.8 (27 Feb 2019 05:46), Max: 100.1 (26 Feb 2019 21:08)  HR: 74 (27 Feb 2019 10:55) (65 - 81)  BP: 104/56 (27 Feb 2019 10:55) (104/56 - 116/57)  BP(mean): --  RR: 16 (27 Feb 2019 05:46) (16 - 17)  SpO2: 97% (27 Feb 2019 10:55) (95% - 98%)    ________________________________________________  PHYSICAL EXAM:  GENERAL: NAD  HEENT: Normocephalic;  conjunctivae and sclerae clear; moist mucous membranes;   NECK : supple  CHEST/LUNG: Clear to auscultation bilaterally with good air entry   HEART: S1 S2  regular; no murmurs, gallops or rubs  ABDOMEN: distended, tender  EXTREMITIES: no cyanosis; no edema; no calf tenderness  NERVOUS SYSTEM:  Awake and alert; Oriented  to place, person and time ; no new deficits  MUSCULOSKELETAL: + decreased rom   _________________________________________________  LABS:                        9.0    5.72  )-----------( 143      ( 27 Feb 2019 05:43 )             28.1     02-27    138  |  105  |  10  ----------------------------<  108<H>  3.9   |  28  |  0.58    Ca    7.9<L>      27 Feb 2019 05:43          CAPILLARY BLOOD GLUCOSE            RADIOLOGY & ADDITIONAL TESTS:    Imaging Personally Reviewed:  YES/NO    Consultant(s) Notes Reviewed:   YES/ No    Care Discussed with Consultants :     Plan of care was discussed with patient and /or primary care giver; all questions and concerns were addressed and care was aligned with patient's wishes.

## 2019-02-27 NOTE — CONSULT NOTE ADULT - PROBLEM SELECTOR RECOMMENDATION 9
- tramadol 50mg po q 8 hours   - hydromorphone dc  - toradol iv prn  - oxy increased to 10mg po prn  - oob/pt

## 2019-02-27 NOTE — DISCHARGE NOTE NURSING/CASE MANAGEMENT/SOCIAL WORK - NSDCDPATPORTLINK_GEN_ALL_CORE
You can access the [facility] Portal, offered by [facility], by registering with the following website: [email or web address]

## 2019-02-27 NOTE — PROGRESS NOTE ADULT - SUBJECTIVE AND OBJECTIVE BOX
63yFemale    Diagnosis:  S/p L Total Knee Replacement POD# 2    Patient was seen and evaluated at bedside. Patient with no acute complaints.   Pain is well controlled. Pt ambulated well with Pt yesterday.   Denies CP/SOB, dyspnea, paresthesias, N/V/D, palpitations.     Vital Signs Last 24 Hrs  T(C): 37.1 (27 Feb 2019 05:46), Max: 37.8 (26 Feb 2019 21:08)  T(F): 98.8 (27 Feb 2019 05:46), Max: 100.1 (26 Feb 2019 21:08)  HR: 81 (27 Feb 2019 05:46) (65 - 81)  BP: 116/57 (27 Feb 2019 05:46) (107/51 - 116/57)  BP(mean): --  RR: 16 (27 Feb 2019 05:46) (16 - 17)  SpO2: 96% (27 Feb 2019 05:46) (95% - 98%)  I&O's Detail      Physical Exam:    General: AAOx3, NAD, resting comfortably in bed.    L KNEE:  Dressing is C/D/I. Dressing changed today- Skin is pink and warm. Staples intact. No erythema. SILT.  Wound with no drainage, healing well.   Lower extremity:  No calf tenderness, calves are soft. 2+pulses. NVI. (+) EHL/FHL/ADF/APF.  Good capillary refill.                           9.0    5.72  )-----------( 143      ( 27 Feb 2019 05:43 )             28.1     02-27    138  |  105  |  10  ----------------------------<  108<H>  3.9   |  28  |  0.58    Ca    7.9<L>      27 Feb 2019 05:43        Impression:  63yFemale S/p L Total Knee Replacement POD# 2  Plan:  -  Pain management  -  Dvt prophylaxis  -  Daily Physical Therapy:  WBAT to LLE  -  Discharge planning: Home today  -  Heel bump to LLE  -  Incentive Spirometer  -  Dressing changed today  -  Case d/w DR. Luna

## 2019-02-27 NOTE — DISCHARGE NOTE PROVIDER - NSDCFUADDINST_GEN_ALL_CORE_FT
Pain Management- See Attached Medication Reconciliation  **StretchStrap Stretching exercises for Total knee replacement***  Weight Bearing Status: WBAT to LLE with walker   Equipment needs: Commode, Walker  Dressing: Please keep bandage/dressing Clean, Dry, and Intact.  Incision Site: REMOVE STAPLES on 03/12/19  STAPLES AND DRESSING TO BE REMOVED BY NURSE  NURSE TO APPLY STERI-STRIPS TO THE WOUND AFTER STAPLE REMOVAL   DVT prophylaxis: D/C LOVENOX on 03/12/19  PT/Occupational Therapy are Activities of Daily Living as appropriate  Follow up with Orthopedic Surgeon, Dr. Luna after STAPLES ARE REMOVED at: [phone] Pain Management- See Attached Medication Reconciliation  **StretchStrap Stretching exercises for Total knee replacement***  Weight Bearing Status: WBAT to LLE with walker   Equipment needs: Commode, Walker  Dressing: Please keep bandage/dressing Clean, Dry, and Intact.  DVT prophylaxis: D/C LOVENOX on 03/12/19  PT/Occupational Therapy are Activities of Daily Living as appropriate  Follow up with Orthopedic Surgeon, Dr. Luna after STAPLES ARE REMOVED at: [phone]

## 2019-02-27 NOTE — PROGRESS NOTE ADULT - PROBLEM SELECTOR PLAN 1
63yFemale S/p L Total Knee Replacement POD# 2  - Pain control  - DVT ppx  - Daily PT-WBAT to LLE  - Dressing changed today

## 2019-02-27 NOTE — DISCHARGE NOTE PROVIDER - NSDCCPCAREPLAN_GEN_ALL_CORE_FT
PRINCIPAL DISCHARGE DIAGNOSIS  Problem: Primary osteoarthritis of left knee  Assessment and Plan of Treatment: Pain Management- See Attached Medication Reconciliation  **StretchStrap Stretching exercises for Total knee replacement***  Weight Bearing Status: WBAT to LLE with walker  Equipment needs: Commode, Walker  Dressing: Please keep bandage/dressing Clean, Dry, and Intact.  Incision Site: REMOVE STAPLES on 03/12/19  STAPLES AND DRESSING TO BE REMOVED BY NURSE  NURSE TO APPLY STERI-STRIPS TO THE WOUND AFTER STAPLE REMOVAL   Dvt prophylaxis: D/C LOVENOX on 03/12/19  PT/Occupational Therapy are Activities of Daily Living as appropriate  Follow up with Orthopedic Surgeon, / Jeremy after STAPLES ARE REMOVED at: [phone]        SECONDARY DISCHARGE DIAGNOSES  Problem: Anxiety and depression  Assessment and Plan of Treatment: Contine with home medication as prescibed by primary care doctor PRINCIPAL DISCHARGE DIAGNOSIS  Problem: Primary osteoarthritis of left knee  Assessment and Plan of Treatment: Pain Management- See Attached Medication Reconciliation  **StretchStrap Stretching exercises for Total knee replacement***  Weight Bearing Status: WBAT to LLE with walker  Equipment needs: Commode, Walker  Dressing: Please keep bandage/dressing Clean, Dry, and Intact.  Dvt prophylaxis: D/C LOVENOX on 03/12/19  PT/Occupational Therapy are Activities of Daily Living as appropriate  Follow up with Orthopedic Surgeon, / Jeremy after STAPLES ARE REMOVED at: [phone]        SECONDARY DISCHARGE DIAGNOSES  Problem: Anxiety and depression  Assessment and Plan of Treatment: Contine with home medication as prescibed by primary care doctor

## 2019-02-27 NOTE — DISCHARGE NOTE NURSING/CASE MANAGEMENT/SOCIAL WORK - NURSING SECTION COMPLETE
Patient/Caregiver provided printed discharge information.
no vomiting/no change in bowel habits/no nausea/no melena/no steatorrhea/no jaundice/no diarrhea/no hematochezia/no hiccoughs/no constipation/no abdominal pain

## 2019-02-27 NOTE — DISCHARGE NOTE PROVIDER - CARE PROVIDER_API CALL
Pardeep Luna)  Orthopaedic Surgery  [address] [place] [address], [address]  [place] [address]  Phone: [phone]  Fax: [phone]  Follow Up Time:

## 2019-07-10 PROCEDURE — 86901 BLOOD TYPING SEROLOGIC RH(D): CPT

## 2019-07-10 PROCEDURE — 73562 X-RAY EXAM OF KNEE 3: CPT

## 2019-07-10 PROCEDURE — 97110 THERAPEUTIC EXERCISES: CPT

## 2019-07-10 PROCEDURE — C1713: CPT

## 2019-07-10 PROCEDURE — 97530 THERAPEUTIC ACTIVITIES: CPT

## 2019-07-10 PROCEDURE — 86900 BLOOD TYPING SEROLOGIC ABO: CPT

## 2019-07-10 PROCEDURE — 85027 COMPLETE CBC AUTOMATED: CPT

## 2019-07-10 PROCEDURE — 97162 PT EVAL MOD COMPLEX 30 MIN: CPT

## 2019-07-10 PROCEDURE — 97116 GAIT TRAINING THERAPY: CPT

## 2019-07-10 PROCEDURE — 86850 RBC ANTIBODY SCREEN: CPT

## 2019-07-10 PROCEDURE — C1776: CPT

## 2019-07-10 PROCEDURE — 36415 COLL VENOUS BLD VENIPUNCTURE: CPT

## 2019-07-10 PROCEDURE — 80048 BASIC METABOLIC PNL TOTAL CA: CPT

## 2021-05-20 NOTE — H&P PST ADULT - PSYCHIATRIC COMMENTS
panic attacks
pt arrived to ED with code stroke activation secondary to confusion, upon further eval pt noted without any focal deficits, intermittent confusion noted. upon retrieval of rectal temp pt noted with 104.2 temp, code stroke cancelled and code sepsis activated. pt oriented to date, self, some of situation, but rambles on about unrelated things and intermittently trouble finishing sentences during eval, frequently closes eyes but thinks she is following direction. some difficulty following simple commands.   daughter-in-law later arrives and states patient was out and about today, last spoke to her around 5pm and she was fine. later on she went to visit and noted pt with confusion, checked oral temp at home due to pt with chills and 103. daughter-in-law reports this happens often and is usually a UTI.   pt denies any urinary complaints, denies any pain, denies any flu-like symptoms.

## 2021-11-16 NOTE — CONSULT NOTE ADULT - CONSULT REQUESTED DATE/TIME
Patient: Cliff Alfredo  : 1936    Primary Care Provider: Bessie Torrez PA    Requesting Provider: As above        History    Chief Complaint: Right leg pain.    History of Present Illness: Ms. Alfredo is an 85-year-old unemployed woman who since May has had bothersome pain really in the front of the right shin and in her foot.  Occasionally the pain involves her right leg globally from the knee down.  At one point it may have involved her right inner thigh.  She has some modest low back pain.  Occasionally she has some symptoms on the left.  She describes tingling and burning in her foot.  Her symptoms are little better sitting in a recliner.  She has no bowel or bladder dysfunction.  She had been on a low-dose of gabapentin which is somewhat helpful.  I increased that when she was seen last.  She has been taking it twice a day and her pain is much better.  Additional physical therapy has also been helpful.    Review of Systems   Constitutional: Negative for activity change, appetite change, chills, diaphoresis, fatigue, fever and unexpected weight change.   HENT: Negative for congestion, dental problem, drooling, ear discharge, ear pain, facial swelling, hearing loss, mouth sores, nosebleeds, postnasal drip, rhinorrhea, sinus pressure, sneezing, sore throat, tinnitus, trouble swallowing and voice change.    Eyes: Negative for photophobia, pain, discharge, redness, itching and visual disturbance.   Respiratory: Negative for apnea, cough, choking, chest tightness, shortness of breath, wheezing and stridor.    Cardiovascular: Negative for chest pain, palpitations and leg swelling.   Gastrointestinal: Negative for abdominal distention, abdominal pain, anal bleeding, blood in stool, constipation, diarrhea, nausea, rectal pain and vomiting.   Endocrine: Negative for cold intolerance, heat intolerance, polydipsia, polyphagia and polyuria.   Genitourinary: Negative for decreased urine volume, difficulty  "urinating, dysuria, enuresis, flank pain, frequency, genital sores, hematuria and urgency.   Musculoskeletal: Negative for arthralgias, back pain, gait problem, joint swelling, myalgias, neck pain and neck stiffness.   Skin: Negative for color change, pallor, rash and wound.   Allergic/Immunologic: Negative for environmental allergies, food allergies and immunocompromised state.   Neurological: Negative for dizziness, tremors, seizures, syncope, facial asymmetry, speech difficulty, weakness, light-headedness, numbness and headaches.   Hematological: Negative for adenopathy. Does not bruise/bleed easily.   Psychiatric/Behavioral: Negative for agitation, behavioral problems, confusion, decreased concentration, dysphoric mood, hallucinations, self-injury, sleep disturbance and suicidal ideas. The patient is not nervous/anxious and is not hyperactive.    All other systems reviewed and are negative.      The patient's past medical history, past surgical history, family history, and social history have been reviewed at length in the electronic medical record.    Physical Exam:   Temp 97.4 °F (36.3 °C) (Infrared)   Resp 16   Ht 152.4 cm (60\")   Wt 76.5 kg (168 lb 9.6 oz)   BMI 32.93 kg/m²   The patient is in good spirits.  Straight leg raising is negative.  Josue signs are negative.    Medical Decision Making    Data Review:   (All imaging studies were personally reviewed unless stated otherwise)  MRI of the lumbar spine dated 8/25/2021 demonstrates some diffuse degenerative disc disease.  There is some disc protrusion more rightward at L4-5 that narrows the proximal foramen and recess.    Diagnosis:   1.  Fairly isolated right lower extremity pain.  2.  L4-5 disc herniation with possible radiculopathy.  3.  Lumbar degenerative disc disease.    Treatment Options:   Fortunately, Ms. Alfredo is doing much better.  I have renewed her gabapentin 300 mg twice a day.  She will continue her exercises and stretching on a home " 26-Feb-2019 12:32 basis.  She will follow-up in our clinic in about 3-4 months.  If she is feeling better then we may slowly back down on the gabapentin.       Diagnosis Plan   1. Lumbar disc herniation with radiculopathy     2. DDD (degenerative disc disease), lumbar         Scribed for Helder Morley MD by Karis Valdes CMA on 11/16/2021 15:43 EST       I, Dr. Morley, personally performed the services described in the documentation, as scribed in my presence, and it is both accurate and complete.